# Patient Record
Sex: FEMALE | Race: WHITE | Employment: FULL TIME | URBAN - METROPOLITAN AREA
[De-identification: names, ages, dates, MRNs, and addresses within clinical notes are randomized per-mention and may not be internally consistent; named-entity substitution may affect disease eponyms.]

---

## 2017-02-23 ENCOUNTER — OFFICE VISIT (OUTPATIENT)
Dept: INTERNAL MEDICINE CLINIC | Age: 57
End: 2017-02-23

## 2017-02-23 VITALS
OXYGEN SATURATION: 98 % | BODY MASS INDEX: 22.49 KG/M2 | RESPIRATION RATE: 16 BRPM | DIASTOLIC BLOOD PRESSURE: 70 MMHG | WEIGHT: 135 LBS | HEIGHT: 65 IN | TEMPERATURE: 97.9 F | HEART RATE: 83 BPM | SYSTOLIC BLOOD PRESSURE: 92 MMHG

## 2017-02-23 DIAGNOSIS — J32.9 CHRONIC SINUSITIS WITH RECURRENT BRONCHITIS: ICD-10-CM

## 2017-02-23 DIAGNOSIS — R06.02 SHORTNESS OF BREATH: ICD-10-CM

## 2017-02-23 DIAGNOSIS — J40 CHRONIC SINUSITIS WITH RECURRENT BRONCHITIS: ICD-10-CM

## 2017-02-23 DIAGNOSIS — J32.0 CHRONIC MAXILLARY SINUSITIS: Primary | ICD-10-CM

## 2017-02-23 DIAGNOSIS — J32.0 CHRONIC MAXILLARY SINUSITIS: ICD-10-CM

## 2017-02-23 RX ORDER — CLINDAMYCIN HYDROCHLORIDE 300 MG/1
CAPSULE ORAL
Qty: 25 CAP | Refills: 0 | Status: SHIPPED | OUTPATIENT
Start: 2017-02-23 | End: 2017-03-02 | Stop reason: SDUPTHER

## 2017-02-23 NOTE — PATIENT INSTRUCTIONS
Health Maintenance Due   Topic Date Due    Hepatitis C Test  1960    Pneumococcal Vaccine (1 of 3 - PCV13) 11/28/1981    DTaP/Tdap/Td  (1 - Tdap) 10/23/1998    Breast Cancer Screening  11/28/2012    Stool testing for trace blood  11/28/2012    Cervical Cancer Screening  05/03/2014    Flu Vaccine  08/01/2016

## 2017-02-23 NOTE — PROGRESS NOTES
1. Have you been to the ER, urgent care clinic since your last visit? Hospitalized since your last visit? No    2. Have you seen or consulted any other health care providers outside of the [address] [place] [address] since your last visit? Include any pap smears or colon screening.  Yes When: Since last visit Where: Dr. Jammie Caraballo Reason for visit: Right elbow Clkeaned out elbow

## 2017-02-25 NOTE — PROGRESS NOTES
The patient presents to the office today with the chief complaint of Sinus Congestion    HPI    The patient complains of sinus congestion with facial fullness. She has difficulty breathing through her nose. The patient also has chest congestion with a minimally productive cough and dyspnea. she denies fever. Review of Systems   Respiratory: Positive for cough and shortness of breath. Cardiovascular: Negative for chest pain and leg swelling. Allergies   Allergen Reactions    Latex Rash    Latex, Natural Rubber Other (comments)    Egg Rash     Other reaction(s): unknown    Lactose Other (comments)    Milk Containing Products Rash    Wheat Rash     Other reaction(s): unknown  Determined by testing       Current Outpatient Prescriptions   Medication Sig Dispense Refill    clindamycin (CLEOCIN) 300 mg capsule 1 capsule three times per day 25 Cap 0    triamterene-hydroCHLOROthiazide (MAXZIDE) 75-50 mg per tablet Take 2 tablets by mouth  daily 180 Tab 2    oxybutynin chloride XL (DITROPAN XL) 10 mg CR tablet Take 1 tablet by mouth two  times daily 90 Tab 3    CLARITIN-D 12 HOUR 5-120 mg per tablet   1    Dexlansoprazole (DEXILANT) 60 mg CpDB Take  by mouth.  montelukast (SINGULAIR) 10 mg tablet Take 10 mg by mouth daily.  immun glob G,IgG,-sucr-IgA 50+ 3 gram solr by IntraVENous route.          Past Medical History:   Diagnosis Date    Bullous impetigo     6/13    Chronic recurrent bronchitis     Chronic sinusitis     Chronic urinary tract infection     Degenerative joint disease     right knee arthroscopy  7/13    Environmental allergies     Eosinophilic esophagitis     GERD     Hemorrhoids     IgG3 subclass deficiency     Patent ductus arteriosus     Patent ductus arteriosus repair     [address]    Stress incontinence     bladder sling 10/13    Urinary incontinence        Past Surgical History:   Procedure Laterality Date    HX BLADDER SUSPENSION 10/13    HX CERVICAL FUSION      C5-6    HX FRACTURE TX      age 17  right arm    HX HEMORRHOIDECTOMY      HX HYSTERECTOMY      HX KNEE ARTHROSCOPY      7/13 right    HX MOHS PROCEDURES      right x2    HX OTHER SURGICAL      PDA repair [facility]    HX OTHER SURGICAL  4/2015    sinus,     HX UROLOGICAL  11/6/15    vitor bargerplastique inj       Social History     Social History    Marital status: SINGLE     Spouse name: N/A    Number of children: N/A    Years of education: N/A     Occupational History    Not on file. Social History Main Topics    Smoking status: Former Smoker    Smokeless tobacco: Never Used    Alcohol use No    Drug use: No    Sexual activity: Yes     Other Topics Concern    Not on file     Social History Narrative    ** Merged History Encounter **            Patient does not have an advanced directive on file    Visit Vitals    BP 92/70 (BP 1 Location: Left arm, BP Patient Position: Sitting)    Pulse 83    Temp 97.9 °F (36.6 °C) (Tympanic)    Resp 16    Ht 5' 5\" (1.651 m)    Wt 135 lb (61.2 kg)    SpO2 98%    BMI 22.47 kg/m2       Physical Exam   HENT:   Mouth/Throat: No oropharyngeal exudate. TM with scarring. Not inflammed   Neck: Carotid bruit is not present. No thyromegaly present. Abdominal: There is no splenomegaly or hepatomegaly. Lymphadenopathy:     She has no cervical adenopathy. Right: No supraclavicular adenopathy present. Left: No supraclavicular adenopathy present. BMI:  OK    No visits with results within 3 Month(s) from this visit.   Latest known visit with results is:    Office Visit on 12/22/2015   Component Date Value Ref Range Status    Color (UA POC) 12/22/2015 Yellow   Final    Clarity (UA POC) 12/22/2015 Clear   Final    Glucose (UA POC) 12/22/2015 Negative  Negative Final    Bilirubin (UA POC) 12/22/2015 Negative  Negative Final    Ketones (UA POC) 12/22/2015 Negative  Negative Final    Specific gravity (UA POC) 12/22/2015 1.010  1.001 - 1.035 Final    Blood (UA POC) 12/22/2015 Negative  Negative Final    pH (UA POC) 12/22/2015 7.0  4.6 - 8.0 Final    Protein (UA POC) 12/22/2015 Negative  Negative mg/dL Final    Urobilinogen (UA POC) 12/22/2015 0.2 mg/dL  0.2 - 1 Final    Nitrites (UA POC) 12/22/2015 Negative  Negative Final    Leukocyte esterase (UA POC) 12/22/2015 Negative  Negative Final    PVR 12/22/2015 33  cc Final    FINAL REPORT 12/22/2015 Microbiology results   Final    Comment: ANTIBIOTIC ALLERGIES*  No Known Antibiotic Allergies    URINE SOURCE:  Clean Catch    COLONY COUNT/RESULT:  1,000 cfu/ml Mixed Urogenital / Skin Dawn         . No results found for any visits on 02/23/17. Assessment / Plan      ICD-10-CM ICD-9-CM    1. Chronic maxillary sinusitis J32.0 473.0 ANTI-NEUTROPHIL CYTOPLASMIC AB      CBC WITH AUTOMATED DIFF      METABOLIC PANEL, COMPREHENSIVE      C REACTIVE PROTEIN, QT      clindamycin (CLEOCIN) 300 mg capsule      AK COLLECTION VENOUS BLOOD,VENIPUNCTURE   2. Chronic recurrent bronchitis J32.9 473.9 ANTI-NEUTROPHIL CYTOPLASMIC AB    J40 490 CBC WITH AUTOMATED DIFF      METABOLIC PANEL, COMPREHENSIVE      C REACTIVE PROTEIN, QT      clindamycin (CLEOCIN) 300 mg capsule      AK COLLECTION VENOUS BLOOD,VENIPUNCTURE   3. Shortness of breath R06.02 786.05 ANTI-NEUTROPHIL CYTOPLASMIC AB      CBC WITH AUTOMATED DIFF      METABOLIC PANEL, COMPREHENSIVE      C REACTIVE PROTEIN, QT      clindamycin (CLEOCIN) 300 mg capsule      AK COLLECTION VENOUS BLOOD,VENIPUNCTURE     Labs including labs for Oneyda's Granulomatosis and Alpha 1 Antitrypsin Deficiency  Clindamycin  she was advised to continue her maintenance medications    Follow-up Disposition:  Return in about 3 weeks (around 3/16/2017). I asked Nilton Izaguirre if she has any questions and I answered the questions. Nilton Ramirez states that she understands the treatment plan and agrees with the treatment plan

## 2017-02-27 LAB
A-G RATIO,AGRAT: 1.7 RATIO (ref 1.1–2.6)
ABSOLUTE LYMPHOCYTE COUNT, 10803: 1.7 K/UL (ref 1–4.8)
ALBUMIN SERPL-MCNC: 4.6 G/DL (ref 3.5–5)
ALP SERPL-CCNC: 71 U/L (ref 25–115)
ALT SERPL-CCNC: 8 U/L (ref 5–40)
ANION GAP SERPL CALC-SCNC: 17 MMOL/L
AST SERPL W P-5'-P-CCNC: 21 U/L (ref 10–37)
ATYPICAL PANCA: NORMAL TITER
BASOPHILS # BLD: 0 K/UL (ref 0–0.2)
BASOPHILS NFR BLD: 0 % (ref 0–2)
BILIRUB SERPL-MCNC: 0.3 MG/DL (ref 0.2–1.2)
BUN SERPL-MCNC: 16 MG/DL (ref 6–22)
C-REACTIVE PROTEIN, QT, 006627: 0.1 MG/DL (ref 0–0.5)
CALCIUM SERPL-MCNC: 10 MG/DL (ref 8.4–10.5)
CHLORIDE SERPL-SCNC: 90 MMOL/L (ref 98–110)
CO2 SERPL-SCNC: 27 MMOL/L (ref 20–32)
CREAT SERPL-MCNC: 0.8 MG/DL (ref 0.5–1.2)
CYTOPLASMIC (C-ANCA), 162400: NORMAL TITER
EOSINOPHIL # BLD: 0.2 K/UL (ref 0–0.5)
EOSINOPHIL NFR BLD: 3 % (ref 0–6)
ERYTHROCYTE [DISTWIDTH] IN BLOOD BY AUTOMATED COUNT: 12.9 % (ref 10–16)
GFRAA, 66117: >60
GFRNA, 66118: >60
GLOBULIN,GLOB: 2.7 G/DL (ref 2–4)
GLUCOSE SERPL-MCNC: 95 MG/DL (ref 65–99)
GRANULOCYTES,GRANS: 64 % (ref 40–75)
HCT VFR BLD AUTO: 43.7 % (ref 35.1–48)
HGB BLD-MCNC: 14.6 G/DL (ref 11.7–16)
LYMPHOCYTES, LYMLT: 25 % (ref 27–45)
MCH RBC QN AUTO: 30 PG (ref 26–34)
MCHC RBC AUTO-ENTMCNC: 33 G/DL (ref 32–36)
MCV RBC AUTO: 91 FL (ref 80–95)
MONOCYTES # BLD: 0.6 K/UL (ref 0.1–0.9)
MONOCYTES NFR BLD: 8 % (ref 3–9)
NEUTROPHILS # BLD AUTO: 4.4 K/UL (ref 1.8–7.7)
PERINULCEAR (P-ANCA), 13235: NORMAL TITER
PLATELET # BLD AUTO: 436 K/UL (ref 140–440)
PMV BLD AUTO: 9.9 FL (ref 6–10.8)
POTASSIUM SERPL-SCNC: 3.4 MMOL/L (ref 3.5–5.5)
PROT SERPL-MCNC: 7.3 G/DL (ref 6.4–8.3)
RBC # BLD AUTO: 4.81 M/UL (ref 3.8–5.2)
SODIUM SERPL-SCNC: 134 MMOL/L (ref 133–145)
WBC # BLD AUTO: 6.8 K/UL (ref 4–11)

## 2017-03-01 DIAGNOSIS — J32.0 CHRONIC MAXILLARY SINUSITIS: ICD-10-CM

## 2017-03-01 DIAGNOSIS — J40 CHRONIC SINUSITIS WITH RECURRENT BRONCHITIS: ICD-10-CM

## 2017-03-01 DIAGNOSIS — J32.9 CHRONIC SINUSITIS WITH RECURRENT BRONCHITIS: ICD-10-CM

## 2017-03-01 DIAGNOSIS — R06.02 SHORTNESS OF BREATH: ICD-10-CM

## 2017-03-01 NOTE — TELEPHONE ENCOUNTER
Patient called and stated that she is still not doing well. She is stilling having to stop and sit down from being out of breath. She would like for the nurse to give her a call back at .

## 2017-03-02 NOTE — TELEPHONE ENCOUNTER
Per Dr Carmina Azul we will do another 5 days of antibiotic and a prednisone pack--patient is to return call to the office if no better after prednisone

## 2017-03-02 NOTE — TELEPHONE ENCOUNTER
Called and spoke with patient who states she is some better but not a lot. Patient states that she still doesn't seem to breath well and that she becomes fatigued easily while trying to walk through house. Questions if anything else needs to be done.

## 2017-03-02 NOTE — TELEPHONE ENCOUNTER
Called and left patient message on her personal voice mail of message--rx's called into [facility] ok per Dr Sharif

## 2017-03-05 RX ORDER — PREDNISONE 20 MG/1
TABLET ORAL
Qty: 20 TAB | Refills: 0 | OUTPATIENT
Start: 2017-03-05 | End: 2017-06-29 | Stop reason: ALTCHOICE

## 2017-03-05 RX ORDER — CLINDAMYCIN HYDROCHLORIDE 300 MG/1
CAPSULE ORAL
Qty: 15 CAP | Refills: 0 | OUTPATIENT
Start: 2017-03-05 | End: 2017-06-29 | Stop reason: ALTCHOICE

## 2017-03-06 NOTE — TELEPHONE ENCOUNTER
Patient called to let Peterson Bhandari know she is still feeling bad and having shortness of breath.   She would like for Peterson Bhandari to call her back at [phone]

## 2017-03-07 RX ORDER — CLARITHROMYCIN 500 MG/1
500 TABLET, FILM COATED ORAL 2 TIMES DAILY
Qty: 14 TAB | Refills: 0 | OUTPATIENT
Start: 2017-03-07 | End: 2017-03-14

## 2017-06-25 DIAGNOSIS — J40 CHRONIC SINUSITIS WITH RECURRENT BRONCHITIS: ICD-10-CM

## 2017-06-25 DIAGNOSIS — J32.0 CHRONIC MAXILLARY SINUSITIS: ICD-10-CM

## 2017-06-25 DIAGNOSIS — R06.02 SHORTNESS OF BREATH: ICD-10-CM

## 2017-06-25 DIAGNOSIS — J32.9 CHRONIC SINUSITIS WITH RECURRENT BRONCHITIS: ICD-10-CM

## 2017-06-29 ENCOUNTER — OFFICE VISIT (OUTPATIENT)
Dept: INTERNAL MEDICINE CLINIC | Age: 57
End: 2017-06-29

## 2017-06-29 VITALS
SYSTOLIC BLOOD PRESSURE: 98 MMHG | OXYGEN SATURATION: 96 % | HEIGHT: 65 IN | HEART RATE: 76 BPM | RESPIRATION RATE: 16 BRPM | TEMPERATURE: 98.9 F | DIASTOLIC BLOOD PRESSURE: 68 MMHG | BODY MASS INDEX: 20.83 KG/M2 | WEIGHT: 125 LBS

## 2017-06-29 DIAGNOSIS — J32.0 CHRONIC MAXILLARY SINUSITIS: ICD-10-CM

## 2017-06-29 DIAGNOSIS — Z02.1 PHYSICAL EXAM, PRE-EMPLOYMENT: ICD-10-CM

## 2017-06-29 DIAGNOSIS — Z00.00 ROUTINE PHYSICAL EXAMINATION: Primary | ICD-10-CM

## 2017-06-29 RX ORDER — SULFAMETHOXAZOLE AND TRIMETHOPRIM 800; 160 MG/1; MG/1
1 TABLET ORAL 2 TIMES DAILY
COMMUNITY
End: 2018-08-28

## 2017-06-29 NOTE — PATIENT INSTRUCTIONS
Health Maintenance Due   Topic    Hepatitis C Screening     Pneumococcal 19-64 Highest Risk (1 of 3 - PCV13)    DTaP/Tdap/Td series (1 - Tdap)    BREAST CANCER SCRN MAMMOGRAM     FOBT Q 1 YEAR AGE 50-75     PAP AKA CERVICAL CYTOLOGY

## 2017-06-29 NOTE — PROGRESS NOTES
1. Have you been to the ER, urgent care clinic since your last visit? Hospitalized since your last visit? [facility] - Pneumonia    2. Have you seen or consulted any other health care providers outside of the [facility] since your last visit? Include any pap smears or colon screening.  No

## 2017-06-30 LAB
A-G RATIO,AGRAT: 1.9 RATIO (ref 1.1–2.6)
ABSOLUTE LYMPHOCYTE COUNT, 10803: 1.9 K/UL (ref 1–4.8)
ALBUMIN SERPL-MCNC: 4.6 G/DL (ref 3.5–5)
ALP SERPL-CCNC: 82 U/L (ref 25–115)
ALT SERPL-CCNC: 9 U/L (ref 5–40)
ANION GAP SERPL CALC-SCNC: 14 MMOL/L
AST SERPL W P-5'-P-CCNC: 21 U/L (ref 10–37)
BASOPHILS # BLD: 0 K/UL (ref 0–0.2)
BASOPHILS NFR BLD: 0 % (ref 0–2)
BILIRUB SERPL-MCNC: 0.3 MG/DL (ref 0.2–1.2)
BUN SERPL-MCNC: 11 MG/DL (ref 6–22)
CALCIUM SERPL-MCNC: 10 MG/DL (ref 8.4–10.5)
CHLORIDE SERPL-SCNC: 89 MMOL/L (ref 98–110)
CO2 SERPL-SCNC: 29 MMOL/L (ref 20–32)
CREAT SERPL-MCNC: 1 MG/DL (ref 0.5–1.2)
EOSINOPHIL # BLD: 0.4 K/UL (ref 0–0.5)
EOSINOPHIL NFR BLD: 4 % (ref 0–6)
ERYTHROCYTE [DISTWIDTH] IN BLOOD BY AUTOMATED COUNT: 12.7 % (ref 10–16)
GFRAA, 66117: >60
GFRNA, 66118: 55.2
GLOBULIN,GLOB: 2.4 G/DL (ref 2–4)
GLUCOSE SERPL-MCNC: 105 MG/DL (ref 65–99)
GRANULOCYTES,GRANS: 68 % (ref 40–75)
HBV SURFACE AB SER RIA-ACNC: DETECTED
HCT VFR BLD AUTO: 42.9 % (ref 35.1–48)
HGB BLD-MCNC: 14 G/DL (ref 11.7–16)
LYMPHOCYTES, LYMLT: 20 % (ref 27–45)
MCH RBC QN AUTO: 30 PG (ref 26–34)
MCHC RBC AUTO-ENTMCNC: 33 G/DL (ref 32–36)
MCV RBC AUTO: 92 FL (ref 80–95)
MONOCYTES # BLD: 0.8 K/UL (ref 0.1–0.9)
MONOCYTES NFR BLD: 8 % (ref 3–9)
NEUTROPHILS # BLD AUTO: 6.6 K/UL (ref 1.8–7.7)
PLATELET # BLD AUTO: 415 K/UL (ref 140–440)
PMV BLD AUTO: 9.1 FL (ref 6–10.8)
POTASSIUM SERPL-SCNC: 3.2 MMOL/L (ref 3.5–5.5)
PROT SERPL-MCNC: 7 G/DL (ref 6.4–8.3)
RBC # BLD AUTO: 4.68 M/UL (ref 3.8–5.2)
RUBV IGG SERPL IA-ACNC: 3 AI
SODIUM SERPL-SCNC: 132 MMOL/L (ref 133–145)
VZV IGM SER IA-ACNC: 0.9 AI
WBC # BLD AUTO: 9.7 K/UL (ref 4–11)

## 2017-06-30 NOTE — PROGRESS NOTES
The patient presents to the office today with the chief complaint of sinus congestion    HPI    The patient complains of congestion and pressure in her right sinus. CT scan is consistent with a chronic infection and impaired drainage. The patint brennon labs for [employer] school and possible employment in the future  Review of Systems   HENT: Positive for congestion. Respiratory: Negative for shortness of breath. Cardiovascular: Negative for chest pain and leg swelling. Allergies   Allergen Reactions    Latex Rash    Latex, Natural Rubber Other (comments)    Egg Rash     Other reaction(s): unknown    Lactose Other (comments)    Milk Containing Products Rash    Wheat Rash     Other reaction(s): unknown  Determined by testing       Current Outpatient Prescriptions   Medication Sig Dispense Refill    trimethoprim-sulfamethoxazole (BACTRIM DS, SEPTRA DS) 160-800 mg per tablet Take 1 Tab by mouth two (2) times a day.  triamterene-hydroCHLOROthiazide (MAXZIDE) 75-50 mg per tablet Take 2 tablets by mouth  daily 180 Tab 2    oxybutynin chloride XL (DITROPAN XL) 10 mg CR tablet Take 1 tablet by mouth two  times daily 90 Tab 3    CLARITIN-D 12 HOUR 5-120 mg per tablet   1    Dexlansoprazole (DEXILANT) 60 mg CpDB Take  by mouth.  montelukast (SINGULAIR) 10 mg tablet Take 10 mg by mouth daily.          Past Medical History:   Diagnosis Date    Bullous impetigo     6/13    Chronic recurrent bronchitis     Chronic sinusitis     Chronic urinary tract infection     Degenerative joint disease     right knee arthroscopy  7/13    Environmental allergies     Eosinophilic esophagitis     GERD     Hemorrhoids     IgG3 subclass deficiency     Patent ductus arteriosus     Patent ductus arteriosus repair     [facility]    Stress incontinence     bladder sling 10/13    Urinary incontinence        Past Surgical History:   Procedure Laterality Date    HX BLADDER SUSPENSION      10/13  HX CERVICAL FUSION      C5-6    HX FRACTURE TX      age 17  right arm    HX HEMORRHOIDECTOMY      HX HYSTERECTOMY      HX KNEE ARTHROSCOPY      7/13 right    HX MOHS PROCEDURES      right x2    HX OTHER SURGICAL      PDA repair 1966 [facility]    HX OTHER SURGICAL  4/2015    sinus,     HX UROLOGICAL  11/6/15    vitor bargerplastjhonatan inj       Social History     Social History    Marital status: SINGLE     Spouse name: N/A    Number of children: N/A    Years of education: N/A     Occupational History    Not on file. Social History Main Topics    Smoking status: Former Smoker    Smokeless tobacco: Never Used    Alcohol use No    Drug use: No    Sexual activity: Yes     Other Topics Concern    Not on file     Social History Narrative    ** Merged History Encounter **            Patient does not have an advanced directive on file    Visit Vitals    BP 98/68 (BP 1 Location: Left arm, BP Patient Position: Sitting)    Pulse 76    Temp 98.9 °F (37.2 °C) (Tympanic)    Resp 16    Ht 5' 5\" (1.651 m)    Wt 125 lb (56.7 kg)    SpO2 96%    BMI 20.8 kg/m2       Physical Exam   No Cervical Lymphadenopathy  No Supraclavicular Lymphadenopathy  Thyroid is Normal  Lungs are clear to ausculation and percussion  Heart:  S1 S2 are normal, No gallops, No mummers  No Carotid Bruits  Abdomen:  Normal Bowel Sounds. No tenderness. No masses. No Hepatomegaly or Splenomegly  LE:  Strong Pedal Pulses. No Edema      BMI:  OK    No visits with results within 3 Month(s) from this visit.   Latest known visit with results is:    Orders Only on 02/23/2017   Component Date Value Ref Range Status    Glucose 02/23/2017 95  65 - 99 mg/dL Final    BUN 02/23/2017 16  6 - 22 mg/dL Final    Creatinine 02/23/2017 0.8  0.5 - 1.2 mg/dL Final    Sodium 02/23/2017 134  133 - 145 mmol/L Final    Potassium 02/23/2017 3.4* 3.5 - 5.5 mmol/L Final    Chloride 02/23/2017 90* 98 - 110 mmol/L Final    CO2 02/23/2017 27  20 - 32 mmol/L Final    AST (SGOT) 02/23/2017 21  10 - 37 U/L Final    ALT (SGPT) 02/23/2017 8  5 - 40 U/L Final    Alk. phosphatase 02/23/2017 71  25 - 115 U/L Final    Bilirubin, total 02/23/2017 0.3  0.2 - 1.2 mg/dL Final    Calcium 02/23/2017 10.0  8.4 - 10.5 mg/dL Final    Protein, total 02/23/2017 7.3  6.4 - 8.3 g/dL Final    Albumin 02/23/2017 4.6  3.5 - 5.0 g/dL Final    A-G Ratio 02/23/2017 1.7  1.1 - 2.6 ratio Final    Globulin 02/23/2017 2.7  2.0 - 4.0 g/dL Final    Anion gap 02/23/2017 17.0  mmol/L Final    Comment: Test includes Albumin, Alkaline Phosphatase, ALT, AST, BUN, Calcium, CO2,  Chloride, Creatinine, Glucose, Potassium, Sodium, Total Bilirubin and Total  Protein. Estimated GFR results are reported in mL/min/1.73 sq.m. by the MDRD equation. This eGFR is validated for stable chronic renal failure patients. This   equation  is unreliable in acute illness or patients with normal renal function.  GFRAA 02/23/2017 >60.0  >60.0 Final    GFRNA 02/23/2017 >60.0  >60.0 Final    C-Reactive Protein, Qt 02/23/2017 0.1  0.0 - 0.5 mg/dL Final    PERINULCEAR (P-ANCA) 02/23/2017 <1:20  Neg:<1:20 titer Final    Comment: The presence of positive fluorescence exhibiting P-ANCA or C-ANCA  patterns alone is not specific for the diagnosis of Wegener's  Granulomatosis (WG) or microscopic polyangiitis. Decisions about  treatment should not be based solely on ANCA IFA results. The  International ANCA Group Consensus recommends follow up testing of  positive sera with both FL-3 and MPO-ANCA enzyme immunoassays. As  many as 5% serum samples are positive only by EIA. Ref. AM J Clin Pathol 1999;111:507-513.  Atypical pANCA 02/23/2017 <1:20  Neg:<1:20 titer Final    Comment: The atypical pANCA pattern has been observed in a significant  percentage of patients with ulcerative colitis, primary sclerosing  cholangitis and autoimmune hepatitis.       Cytoplasmic (C-ANCA) Ab 02/23/2017 <1:20 Neg:<1:20 titer Final    Comment: Test includes C-ANCA and P-ANCA. Performed at:  [address]  [id number]  : Dory Singh MD, Phone:  [phone]      WBC 02/23/2017 6.8  4.0 - 11.0 K/uL Final    RBC 02/23/2017 4.81  3.80 - 5.20 M/uL Final    HGB 02/23/2017 14.6  11.7 - 16.0 g/dL Final    HCT 02/23/2017 43.7  35.1 - 48.0 % Final    MCV 02/23/2017 91  80 - 95 fL Final    MCH 02/23/2017 30  26 - 34 pg Final    MCHC 02/23/2017 33  32 - 36 g/dL Final    RDW 02/23/2017 12.9  10.0 - 16.0 % Final    PLATELET 51/24/1612 735  140 - 440 K/uL Final    MPV 02/23/2017 9.9  6.0 - 10.8 fL Final    NEUTROPHILS 02/23/2017 64  40 - 75 % Final    Lymphocytes 02/23/2017 25* 27 - 45 % Final    MONOCYTES 02/23/2017 8  3 - 9 % Final    EOSINOPHILS 02/23/2017 3  0 - 6 % Final    BASOPHILS 02/23/2017 0  0 - 2 % Final    ABS. NEUTROPHILS 02/23/2017 4.4  1.8 - 7.7 K/uL Final    ABSOLUTE LYMPHOCYTE COUNT 02/23/2017 1.7  1.0 - 4.8 K/uL Final    ABS. MONOCYTES 02/23/2017 0.6  0.1 - 0.9 K/uL Final    ABS. EOSINOPHILS 02/23/2017 0.2  0.0 - 0.5 K/uL Final    ABS. BASOPHILS 02/23/2017 0.0  0.0 - 0.2 K/uL Final       .No results found for any visits on 06/29/17. Assessment / Plan      ICD-10-CM ICD-9-CM    1. Physical exam, pre-employment Z02.1 V70.5 trimethoprim-sulfamethoxazole (BACTRIM DS, SEPTRA DS) 160-800 mg per tablet      HEP B SURFACE AB      RUBELLA AB, IGG      VZV AB, IGG      CBC WITH AUTOMATED DIFF      METABOLIC PANEL, COMPREHENSIVE   2. Chronic maxillary sinusitis J32.0 473.0 trimethoprim-sulfamethoxazole (BACTRIM DS, SEPTRA DS) 160-800 mg per tablet      HEP B SURFACE AB      RUBELLA AB, IGG      VZV AB, IGG      CBC WITH AUTOMATED DIFF      METABOLIC PANEL, COMPREHENSIVE     Labs  Follow up with ENT -  antibiotics to see if symptoms quiet    Follow-up Disposition:  Return in about 6 months (around 12/29/2017). I asked Lethaniel Sandifer. Fisher if she has any questions and I answered the questions. Homar Olson.  Bettye Zeb states that she understands the treatment plan and agrees with the treatment plan

## 2017-07-11 RX ORDER — POTASSIUM CHLORIDE 750 MG/1
10 TABLET, EXTENDED RELEASE ORAL DAILY
Qty: 30 TAB | Refills: 1 | Status: SHIPPED | OUTPATIENT
Start: 2017-07-11 | End: 2017-12-01 | Stop reason: ALTCHOICE

## 2017-10-20 RX ORDER — TRIAMTERENE AND HYDROCHLOROTHIAZIDE 75; 50 MG/1; MG/1
TABLET ORAL
Qty: 180 TAB | Refills: 2 | Status: SHIPPED | OUTPATIENT
Start: 2017-10-20 | End: 2018-08-28

## 2017-10-20 NOTE — TELEPHONE ENCOUNTER
Requested Prescriptions     Pending Prescriptions Disp Refills    triamterene-hydroCHLOROthiazide (MAXZIDE) 75-50 mg per tablet 180 Tab 2

## 2017-10-29 DIAGNOSIS — Z02.1 PHYSICAL EXAM, PRE-EMPLOYMENT: ICD-10-CM

## 2017-10-29 DIAGNOSIS — J32.0 CHRONIC MAXILLARY SINUSITIS: ICD-10-CM

## 2018-01-29 ENCOUNTER — TELEPHONE (OUTPATIENT)
Dept: INTERNAL MEDICINE CLINIC | Age: 58
End: 2018-01-29

## 2018-01-29 DIAGNOSIS — F43.9 STRESS: Primary | ICD-10-CM

## 2018-01-29 RX ORDER — LORAZEPAM 0.5 MG/1
0.5 TABLET ORAL
Qty: 15 TAB | Refills: 1 | Status: SHIPPED | OUTPATIENT
Start: 2018-01-29 | End: 2018-08-28

## 2018-01-29 NOTE — TELEPHONE ENCOUNTER
Per Dr. Shagufta Dunn, I talked with patient to let her know that Dr. Shagufta Dunn is sending in Ativan 0.5mg tablet at [facility] to help her sleep. Patient verbalized understanding.

## 2018-01-29 NOTE — TELEPHONE ENCOUNTER
Patient was here on Friday with her Mom for an appointment and Dr Ivan Oakes said he would send in a sleep aide that is non drowsy to [facility] on [address].  Please advise patient at [phone].

## 2018-01-30 NOTE — TELEPHONE ENCOUNTER
Called the following rx into [facility] on file per Dr. Negro Bruce:    Lorazepam 0.5mg tab  #15, #1 RF    Dx: Q03.0

## 2018-02-05 ENCOUNTER — TELEPHONE (OUTPATIENT)
Dept: INTERNAL MEDICINE CLINIC | Age: 58
End: 2018-02-05

## 2018-02-05 NOTE — TELEPHONE ENCOUNTER
Patient was prescribed Ativan and has been taking it for a week. She stated it is not working. Please advise [phone].

## 2018-02-06 ENCOUNTER — TELEPHONE (OUTPATIENT)
Dept: INTERNAL MEDICINE CLINIC | Age: 58
End: 2018-02-06

## 2018-02-06 DIAGNOSIS — F51.01 PRIMARY INSOMNIA: Primary | ICD-10-CM

## 2018-02-06 RX ORDER — ZOLPIDEM TARTRATE 5 MG/1
5 TABLET ORAL
Qty: 30 TAB | Refills: 0
Start: 2018-02-06 | End: 2018-03-14 | Stop reason: SDUPTHER

## 2018-02-09 RX ORDER — TRAZODONE HYDROCHLORIDE 100 MG/1
TABLET ORAL
Qty: 30 TAB | Refills: 1 | Status: SHIPPED | OUTPATIENT
Start: 2018-02-09 | End: 2018-08-28

## 2018-03-09 ENCOUNTER — TELEPHONE (OUTPATIENT)
Dept: INTERNAL MEDICINE CLINIC | Age: 58
End: 2018-03-09

## 2018-03-09 RX ORDER — CEFUROXIME AXETIL 500 MG/1
TABLET ORAL
Qty: 14 TAB | Refills: 0 | Status: SHIPPED | OUTPATIENT
Start: 2018-03-09 | End: 2018-08-28

## 2018-03-09 RX ORDER — PREDNISONE 20 MG/1
TABLET ORAL
Qty: 25 TAB | Refills: 0 | Status: SHIPPED | OUTPATIENT
Start: 2018-03-09 | End: 2018-08-28

## 2018-03-14 DIAGNOSIS — F51.01 PRIMARY INSOMNIA: ICD-10-CM

## 2018-03-14 RX ORDER — ZOLPIDEM TARTRATE 5 MG/1
5 TABLET ORAL
Qty: 30 TAB | Refills: 0 | Status: SHIPPED | OUTPATIENT
Start: 2018-03-14 | End: 2018-04-17 | Stop reason: SDUPTHER

## 2018-03-14 NOTE — TELEPHONE ENCOUNTER
From: Tanya Mann  To: Grant Almonte MD  Sent: 3/14/2018 10:21 AM EDT  Subject: Medication Renewal Request    Original authorizing provider: MD Ailyn Thompson would like a refill of the following medications:  zolpidem (AMBIEN) 5 mg tablet Grant Almonte MD]    Preferred pharmacy: [place] [id number] [facility] AT [address] [place] [address]    Comment:

## 2018-03-15 ENCOUNTER — TELEPHONE (OUTPATIENT)
Dept: INTERNAL MEDICINE CLINIC | Age: 58
End: 2018-03-15

## 2018-04-17 DIAGNOSIS — F51.01 PRIMARY INSOMNIA: ICD-10-CM

## 2018-04-18 NOTE — TELEPHONE ENCOUNTER
From: Araceli Holland  To: Jessica Hinkle MD  Sent: 4/17/2018 9:58 PM EDT  Subject: Medication Renewal Request    Original authorizing provider: MD Phuc Smith.  Dmitriy would like a refill of the following medications:  zolpidem (AMBIEN) 5 mg tablet Jessica Hinkle MD]    Preferred pharmacy: [place] [id number] [facility]  AT [address] [place] [address]    Comment:

## 2018-04-19 RX ORDER — ZOLPIDEM TARTRATE 5 MG/1
5 TABLET ORAL
Qty: 30 TAB | Refills: 0 | Status: SHIPPED | OUTPATIENT
Start: 2018-04-19 | End: 2018-05-22 | Stop reason: SDUPTHER

## 2018-05-22 DIAGNOSIS — F51.01 PRIMARY INSOMNIA: ICD-10-CM

## 2018-05-23 RX ORDER — ZOLPIDEM TARTRATE 5 MG/1
5 TABLET ORAL
Qty: 30 TAB | Refills: 0 | Status: SHIPPED | OUTPATIENT
Start: 2018-05-23 | End: 2018-06-27 | Stop reason: SDUPTHER

## 2018-05-23 NOTE — TELEPHONE ENCOUNTER
From: Tiffany Tejeda  To: Saran Birmingham MD  Sent: 5/22/2018 9:55 PM EDT  Subject: Medication Renewal Request    Original authorizing provider: MD Darshana Burrows.  Dmitriy would like a refill of the following medications:  zolpidem (AMBIEN) 5 mg tablet Saran Birmingham MD]    Preferred pharmacy: [place] [id number] [facility]  AT [address] [place] [address]    Comment:

## 2018-06-27 DIAGNOSIS — F51.01 PRIMARY INSOMNIA: ICD-10-CM

## 2018-06-28 RX ORDER — ZOLPIDEM TARTRATE 5 MG/1
5 TABLET ORAL
Qty: 30 TAB | Refills: 0 | Status: SHIPPED | OUTPATIENT
Start: 2018-06-28 | End: 2018-08-11 | Stop reason: SDUPTHER

## 2018-06-28 NOTE — TELEPHONE ENCOUNTER
From: Dulce Johnson  To: Isabel Tanner MD  Sent: 6/27/2018 9:09 PM EDT  Subject: Medication Renewal Request    Original authorizing provider: MD Ailyn Brand would like a refill of the following medications:  zolpidem (AMBIEN) 5 mg tablet Isabel Tanner MD]    Preferred pharmacy: [place] [id number] [facility]  AT [address] [place] [address]    Comment:

## 2018-08-11 DIAGNOSIS — F51.01 PRIMARY INSOMNIA: ICD-10-CM

## 2018-08-13 RX ORDER — ZOLPIDEM TARTRATE 5 MG/1
5 TABLET ORAL
Qty: 30 TAB | Refills: 0 | Status: SHIPPED | OUTPATIENT
Start: 2018-08-13 | End: 2018-11-30 | Stop reason: SDUPTHER

## 2018-08-13 NOTE — TELEPHONE ENCOUNTER
From: Judson Nash  To: Alec Gallardo MD  Sent: 8/11/2018 4:22 PM EDT  Subject: Medication Renewal Request    Original authorizing provider: MD Royal Jl Wang would like a refill of the following medications:  zolpidem (AMBIEN) 5 mg tablet Alec Gallardo MD]    Preferred pharmacy: [place] [id number] [facility]  AT [address] [place] [address]    Comment:

## 2018-08-17 ENCOUNTER — TELEPHONE (OUTPATIENT)
Dept: INTERNAL MEDICINE CLINIC | Age: 58
End: 2018-08-17

## 2018-11-09 ENCOUNTER — TELEPHONE (OUTPATIENT)
Dept: INTERNAL MEDICINE CLINIC | Age: 58
End: 2018-11-09

## 2018-11-09 RX ORDER — TRIAMTERENE AND HYDROCHLOROTHIAZIDE 75; 50 MG/1; MG/1
TABLET ORAL
Qty: 180 TAB | Refills: 2 | Status: SHIPPED | OUTPATIENT
Start: 2018-11-09 | End: 2021-02-11 | Stop reason: ALTCHOICE

## 2018-11-09 NOTE — TELEPHONE ENCOUNTER
The medication was not on the med list but she is requesting  Triamterene HZT (maxzide) 75-50 mg per tablet

## 2018-11-30 ENCOUNTER — OFFICE VISIT (OUTPATIENT)
Dept: INTERNAL MEDICINE CLINIC | Age: 58
End: 2018-11-30

## 2018-11-30 VITALS
OXYGEN SATURATION: 97 % | HEART RATE: 69 BPM | SYSTOLIC BLOOD PRESSURE: 100 MMHG | TEMPERATURE: 98.3 F | DIASTOLIC BLOOD PRESSURE: 76 MMHG | HEIGHT: 65 IN | BODY MASS INDEX: 21.66 KG/M2 | RESPIRATION RATE: 16 BRPM | WEIGHT: 130 LBS

## 2018-11-30 DIAGNOSIS — Z01.811 PRE-OP CHEST EXAM: ICD-10-CM

## 2018-11-30 DIAGNOSIS — F51.01 PRIMARY INSOMNIA: ICD-10-CM

## 2018-11-30 DIAGNOSIS — Z00.00 ANNUAL PHYSICAL EXAM: Primary | ICD-10-CM

## 2018-11-30 DIAGNOSIS — Z01.818 PRE-OP EVALUATION: ICD-10-CM

## 2018-11-30 RX ORDER — ZOLPIDEM TARTRATE 5 MG/1
5 TABLET ORAL
Qty: 90 TAB | Refills: 1 | Status: SHIPPED | OUTPATIENT
Start: 2018-11-30 | End: 2019-04-03 | Stop reason: SDUPTHER

## 2018-11-30 NOTE — PROGRESS NOTES
1. Have you been to the ER, urgent care clinic since your last visit? Hospitalized since your last visit? No    2. Have you seen or consulted any other health care providers outside of the [address] [place] [address] since your last visit? Include any pap smears or colon screening.  No

## 2018-11-30 NOTE — PROGRESS NOTES
The patient presents to the office today for a check up    HPI    The patient presents to the office today for a check up. The patient remains on medications for reflux. The reflux is in good control on the medications. The patient also remains  On Maxide for LE swelling. she is doing ok on the medications. .  The patient has complaints of pain with movement in her left hand and wrist..  The patient is scheduled for surgery on the tendons in her left hand and wrist      Review of Systems   Respiratory: Negative for shortness of breath. Cardiovascular: Negative for chest pain and leg swelling. Musculoskeletal:        Left wrist pain as per HPI       Allergies   Allergen Reactions    Latex Rash    Latex, Natural Rubber Other (comments)    Egg Rash     Other reaction(s): unknown    Lactose Other (comments)    Milk Containing Products Rash    Wheat Rash     Other reaction(s): unknown  Determined by testing       Current Outpatient Medications   Medication Sig Dispense Refill    zolpidem (AMBIEN) 5 mg tablet Take 1 Tab by mouth nightly as needed for Sleep. Max Daily Amount: 5 mg. 90 Tab 1    triamterene-hydroCHLOROthiazide (MAXZIDE) 75-50 mg per tablet TAKE 2 TABLETS BY MOUTH  DAILY 180 Tab 2    oxybutynin chloride XL (DITROPAN XL) 10 mg CR tablet Take 1 Tab by mouth two (2) times a day. 180 Tab 3    CLARITIN-D 12 HOUR 5-120 mg per tablet   1    Dexlansoprazole (DEXILANT) 60 mg CpDB Take  by mouth.  montelukast (SINGULAIR) 10 mg tablet Take 10 mg by mouth daily.          Past Medical History:   Diagnosis Date    Adverse effect of anesthesia     Arthropathy     Asthma     Bullous impetigo     6/13    Chronic recurrent bronchitis     Chronic sinusitis     Chronic urinary tract infection     Deficiency anemia     Degenerative joint disease     right knee arthroscopy  7/13    Environmental allergies     Eosinophilic esophagitis     Esophageal reflux     Esophageal stricture     GERD     Hemorrhoids     inter. wo mention of complication     History of blood transfusion     IgG3 subclass deficiency     MRSA (methicillin resistant Staphylococcus aureus)     Patent ductus arteriosus     Patent ductus arteriosus repair     [address]    PONV (postoperative nausea and vomiting)     Stress incontinence     bladder sling 10/13    Urinary incontinence     Urinary system disorder        Past Surgical History:   Procedure Laterality Date    HX BLADDER SUSPENSION  10/21/2013    HX CERVICAL FUSION      C5-6    HX FRACTURE TX      age 17  right arm    HX HEMORRHOIDECTOMY      HX HYSTERECTOMY      HX KNEE ARTHROSCOPY  07/2013     right    HX MOHS PROCEDURES      right x2    HX OTHER SURGICAL      PDA repair [address] OTHER SURGICAL  12/15, 2/15, 10/17, 11/17    sinus    HX OTHER SURGICAL  08/18/2014    Bronchoscopy     HX OTHER SURGICAL  02/2015, 04/2017, 08/2017    EGD    HX UROLOGICAL  11/06/2015    INJ. BULK. AGENT FOR Jose Andrew.         Social History     Socioeconomic History    Marital status: SINGLE     Spouse name: Not on file    Number of children: Not on file    Years of education: Not on file    Highest education level: Not on file   Social Needs    Financial resource strain: Not on file    Food insecurity - worry: Not on file    Food insecurity - inability: Not on file    Transportation needs - medical: Not on file   [employer] needs - non-medical: Not on file   Occupational History    Not on file   Tobacco Use    Smoking status: Never Smoker    Smokeless tobacco: Never Used    Tobacco comment: high school experiment   Substance and Sexual Activity    Alcohol use:  Yes     Alcohol/week: 0.0 oz     Comment: occasional    Drug use: No    Sexual activity: Yes   Other Topics Concern    Not on file   Social History Narrative    ** Merged History Encounter **            Patient does not have an advanced directive on file    Visit Vitals  /76 (BP 1 Location: Right arm, BP Patient Position: Sitting)   Pulse 69   Temp 98.3 °F (36.8 °C) (Tympanic)   Resp 16   Ht 5' 5\" (1.651 m)   Wt 130 lb (59 kg)   SpO2 97%   BMI 21.63 kg/m²       Physical Exam   No Cervical Lymphadenopathy  No Supraclavicular Lymphadenopathy  Thyroid is Normal  Lungs are normal to percussion. Clear to auscultation   Heart:  S1 S2 are normal, No gallops, No murmurs  No Carotid Bruits  Abdomen:  Normal Bowel Sounds. No tenderness. No masses. No Hepatomegaly or Splenomegaly  LE:  Strong Pedal Pulses.   No Edema      BMI:  OK    Office Visit on 11/30/2018   Component Date Value Ref Range Status    Specific Gravity 11/30/2018 1.005  1.005 - 1.03 Final    pH (UA) 11/30/2018 8.0  5.0 - 8.0 pH Final    Protein 11/30/2018 Negative  Negative, mg/dL Final    Glucose 11/30/2018 Negative  Negative mg/dL Final    Ketone 11/30/2018 Negative  Negative mg/dL Final    Bilirubin 11/30/2018 Negative  Negative Final    Blood 11/30/2018 Negative  Negative Final    Nitrites 11/30/2018 Negative  Negative Final    Leukocyte Esterase 11/30/2018 Trace* Negative Final    Urobilinogen 11/30/2018 <2.0  <2.0 mg/dL Final    WBC 11/30/2018 0-2  0 - 2 /hpf Final    RBC 11/30/2018 0-2  Negative, /hpf Final    Bacteria 11/30/2018 Present* Negative Final    Epithelial cells 11/30/2018 0-2  0 - 2 /hpf Final    WBC 11/30/2018 7.2  4.0 - 11.0 K/uL Final    RBC 11/30/2018 4.75  3.80 - 5.20 M/uL Final    HGB 11/30/2018 14.7  11.7 - 16.0 g/dL Final    HCT 11/30/2018 43.5  35.1 - 48.0 % Final    MCV 11/30/2018 92  80 - 95 fL Final    MCH 11/30/2018 31  26 - 34 pg Final    MCHC 11/30/2018 34  31 - 36 g/dL Final    RDW 11/30/2018 12.8  10.0 - 15.5 % Final    PLATELET 41/47/3674 732  140 - 440 K/uL Final    MPV 11/30/2018 10.1  9.0 - 13.0 fL Final    NEUTROPHILS 11/30/2018 60  40 - 75 % Final    Lymphocytes 11/30/2018 31  20 - 45 % Final    MONOCYTES 11/30/2018 7  3 - 12 % Final    EOSINOPHILS 11/30/2018 2  0 - 6 % Final    BASOPHILS 11/30/2018 1  0 - 2 % Final    ABS. NEUTROPHILS 11/30/2018 4.3  1.8 - 7.7 K/uL Final    ABSOLUTE LYMPHOCYTE COUNT 11/30/2018 2.2  1.0 - 4.8 K/uL Final    ABS. MONOCYTES 11/30/2018 0.5  0.1 - 1.0 K/uL Final    ABS. EOSINOPHILS 11/30/2018 0.2  0.0 - 0.5 K/uL Final    ABS.  BASOPHILS 11/30/2018 0.1  0.0 - 0.2 K/uL Final   Office Visit on 10/17/2018   Component Date Value Ref Range Status    Color (UA POC) 10/17/2018 Yellow   Final    Clarity (UA POC) 10/17/2018 Clear   Final    Glucose (UA POC) 10/17/2018 Negative  Negative Final    Bilirubin (UA POC) 10/17/2018 Negative  Negative Final    Ketones (UA POC) 10/17/2018 Negative  Negative Final    Specific gravity (UA POC) 10/17/2018 1.010  1.001 - 1.035 Final    Blood (UA POC) 10/17/2018 Trace  Negative Final    pH (UA POC) 10/17/2018 6.5  4.6 - 8.0 Final    Protein (UA POC) 10/17/2018 Negative  Negative Final    Urobilinogen (UA POC) 10/17/2018 0.2 mg/dL  0.2 - 1 Final    Nitrites (UA POC) 10/17/2018 Negative  Negative Final    Leukocyte esterase (UA POC) 10/17/2018 Negative  Negative Final    PVR 10/17/2018 0  cc Final   Clinical Support on 10/04/2018   Component Date Value Ref Range Status    Color (UA POC) 10/04/2018 Yellow   Final    Clarity (UA POC) 10/04/2018 Clear   Final    Glucose (UA POC) 10/04/2018 Negative  Negative Final    Bilirubin (UA POC) 10/04/2018 Negative  Negative Final    Ketones (UA POC) 10/04/2018 Negative  Negative Final    Specific gravity (UA POC) 10/04/2018 1.010  1.001 - 1.035 Final    Blood (UA POC) 10/04/2018 Trace  Negative Final    pH (UA POC) 10/04/2018 7.0  4.6 - 8.0 Final    Protein (UA POC) 10/04/2018 Negative  Negative Final    Urobilinogen (UA POC) 10/04/2018 0.2 mg/dL  0.2 - 1 Final    Nitrites (UA POC) 10/04/2018 Negative  Negative Final    Leukocyte esterase (UA POC) 10/04/2018 Negative  Negative Final    FINAL REPORT 10/04/2018 Microbiology results*  Final    Comment: ANTIBIOTIC ALLERGIES  No Known Antibiotic Allergies    URINE SOURCE:  Clean Catch    COLONY COUNT / RESULT:   50,000 cfu/ml    RESULT/ORG ID[[id number]]  Beta Strep Group B (Strep agalacticae). Penicillin, Ampicillin  and Cefazolin are drugs of choice-per CLSI guidelines. .  Results for orders placed or performed in visit on 11/30/18   URINALYSIS W/MICROSCOPIC   Result Value Ref Range    Specific Gravity 1.005 1.005 - 1.03    pH (UA) 8.0 5.0 - 8.0 pH    Protein Negative Negative, mg/dL    Glucose Negative Negative mg/dL    Ketone Negative Negative mg/dL    Bilirubin Negative Negative    Blood Negative Negative    Nitrites Negative Negative    Leukocyte Esterase Trace (A) Negative    Urobilinogen <2.0 <2.0 mg/dL    WBC 0-2 0 - 2 /hpf    RBC 0-2 Negative, /hpf    Bacteria Present (A) Negative    Epithelial cells 0-2 0 - 2 /hpf    Narrative    Unless additionally indicated, test performed at: [facility]. PH: [phone]. CBC WITH AUTOMATED DIFF   Result Value Ref Range    WBC 7.2 4.0 - 11.0 K/uL    RBC 4.75 3.80 - 5.20 M/uL    HGB 14.7 11.7 - 16.0 g/dL    HCT 43.5 35.1 - 48.0 %    MCV 92 80 - 95 fL    MCH 31 26 - 34 pg    MCHC 34 31 - 36 g/dL    RDW 12.8 10.0 - 15.5 %    PLATELET 915 603 - 227 K/uL    MPV 10.1 9.0 - 13.0 fL    NEUTROPHILS 60 40 - 75 %    Lymphocytes 31 20 - 45 %    MONOCYTES 7 3 - 12 %    EOSINOPHILS 2 0 - 6 %    BASOPHILS 1 0 - 2 %    ABS. NEUTROPHILS 4.3 1.8 - 7.7 K/uL    ABSOLUTE LYMPHOCYTE COUNT 2.2 1.0 - 4.8 K/uL    ABS. MONOCYTES 0.5 0.1 - 1.0 K/uL    ABS. EOSINOPHILS 0.2 0.0 - 0.5 K/uL    ABS. BASOPHILS 0.1 0.0 - 0.2 K/uL    Narrative    Unless additionally indicated, test performed at: [facility] PH: [phone]. Assessment / Plan      ICD-10-CM ICD-9-CM    1.  Annual physical exam Z00.00 V70.0 CBC WITH AUTOMATED DIFF METABOLIC PANEL, COMPREHENSIVE      LIPID PANEL      URINALYSIS W/MICROSCOPIC      URINALYSIS W/MICROSCOPIC      LIPID PANEL      METABOLIC PANEL, COMPREHENSIVE      CBC WITH AUTOMATED DIFF   2. Pre-op chest exam Z01.811 V72.82    3. Pre-op evaluation Z01.818 V72.84 CBC WITH AUTOMATED DIFF      METABOLIC PANEL, COMPREHENSIVE      URINALYSIS W/MICROSCOPIC      AMB POC EKG ROUTINE W/ 12 LEADS, INTER & REP      METABOLIC PANEL, COMPREHENSIVE       PATIENT IS OK FOR SURGERY    I advised the patient to continue good health habits  she was advised to continue her maintenance medications    Follow-up Disposition:  Return in about 6 months (around 5/30/2019). I asked Ra Izaguirre if she has any questions and I answered the questions. Ra Campos states that she understands the treatment plan and agrees with the treatment plan

## 2018-12-01 LAB
A-G RATIO,AGRAT: 1.6 RATIO (ref 1.1–2.6)
ABSOLUTE LYMPHOCYTE COUNT, 10803: 2.2 K/UL (ref 1–4.8)
ALBUMIN SERPL-MCNC: 4.5 G/DL (ref 3.5–5)
ALP SERPL-CCNC: 78 U/L (ref 25–115)
ALT SERPL-CCNC: 10 U/L (ref 5–40)
ANION GAP SERPL CALC-SCNC: 17 MMOL/L
AST SERPL W P-5'-P-CCNC: 20 U/L (ref 10–37)
BACTERIA,BACTU: PRESENT
BASOPHILS # BLD: 0.1 K/UL (ref 0–0.2)
BASOPHILS NFR BLD: 1 % (ref 0–2)
BILIRUB SERPL-MCNC: 0.6 MG/DL (ref 0.2–1.2)
BILIRUB UR QL: NEGATIVE
BUN SERPL-MCNC: 13 MG/DL (ref 6–22)
CALCIUM SERPL-MCNC: 10.1 MG/DL (ref 8.4–10.5)
CHLORIDE SERPL-SCNC: 92 MMOL/L (ref 98–110)
CHOLEST SERPL-MCNC: 188 MG/DL (ref 110–200)
CO2 SERPL-SCNC: 27 MMOL/L (ref 20–32)
CREAT SERPL-MCNC: 0.8 MG/DL (ref 0.5–1.2)
EOSINOPHIL # BLD: 0.2 K/UL (ref 0–0.5)
EOSINOPHIL NFR BLD: 2 % (ref 0–6)
EPITHELIAL,EPSU: ABNORMAL /HPF (ref 0–2)
ERYTHROCYTE [DISTWIDTH] IN BLOOD BY AUTOMATED COUNT: 12.8 % (ref 10–15.5)
GFRAA, 66117: >60
GFRNA, 66118: >60
GLOBULIN,GLOB: 2.9 G/DL (ref 2–4)
GLUCOSE SERPL-MCNC: 75 MG/DL (ref 70–99)
GLUCOSE UR QL: NEGATIVE MG/DL
GRANULOCYTES,GRANS: 60 % (ref 40–75)
HCT VFR BLD AUTO: 43.5 % (ref 35.1–48)
HDLC SERPL-MCNC: 1.9 MG/DL (ref 0–5)
HDLC SERPL-MCNC: 100 MG/DL (ref 40–59)
HGB BLD-MCNC: 14.7 G/DL (ref 11.7–16)
HGB UR QL STRIP: NEGATIVE
KETONES UR QL STRIP.AUTO: NEGATIVE MG/DL
LDLC SERPL CALC-MCNC: 76 MG/DL (ref 50–99)
LEUKOCYTE ESTERASE: ABNORMAL
LYMPHOCYTES, LYMLT: 31 % (ref 20–45)
MCH RBC QN AUTO: 31 PG (ref 26–34)
MCHC RBC AUTO-ENTMCNC: 34 G/DL (ref 31–36)
MCV RBC AUTO: 92 FL (ref 80–95)
MONOCYTES # BLD: 0.5 K/UL (ref 0.1–1)
MONOCYTES NFR BLD: 7 % (ref 3–12)
NEUTROPHILS # BLD AUTO: 4.3 K/UL (ref 1.8–7.7)
NITRITE UR QL STRIP.AUTO: NEGATIVE
PH UR STRIP: 8 PH (ref 5–8)
PLATELET # BLD AUTO: 382 K/UL (ref 140–440)
PMV BLD AUTO: 10.1 FL (ref 9–13)
POTASSIUM SERPL-SCNC: 3.4 MMOL/L (ref 3.5–5.5)
PROT SERPL-MCNC: 7.4 G/DL (ref 6.4–8.3)
PROT UR QL STRIP: NEGATIVE MG/DL
RBC # BLD AUTO: 4.75 M/UL (ref 3.8–5.2)
RBC #/AREA URNS HPF: ABNORMAL /HPF
SODIUM SERPL-SCNC: 136 MMOL/L (ref 133–145)
SP GR UR: 1 (ref 1–1.03)
TRIGL SERPL-MCNC: 63 MG/DL (ref 40–149)
UROBILINOGEN UR STRIP-MCNC: <2 MG/DL
VLDLC SERPL CALC-MCNC: 13 MG/DL (ref 8–30)
WBC # BLD AUTO: 7.2 K/UL (ref 4–11)
WBC URNS QL MICRO: ABNORMAL /HPF (ref 0–2)

## 2018-12-04 ENCOUNTER — TELEPHONE (OUTPATIENT)
Dept: INTERNAL MEDICINE CLINIC | Age: 58
End: 2018-12-04

## 2018-12-05 NOTE — TELEPHONE ENCOUNTER
Patient was notified per Dr. Jose Guadalupe Ace all labs were good except Potassium alittle low  And to eat some bananas. Placed on VM.

## 2019-03-22 DIAGNOSIS — F51.01 PRIMARY INSOMNIA: ICD-10-CM

## 2019-03-22 RX ORDER — ZOLPIDEM TARTRATE 5 MG/1
5 TABLET ORAL
Qty: 90 TAB | Refills: 1 | Status: CANCELLED | OUTPATIENT
Start: 2019-03-22

## 2019-03-22 NOTE — TELEPHONE ENCOUNTER
Requested Prescriptions     Pending Prescriptions Disp Refills    zolpidem (AMBIEN) 5 mg tablet 90 Tab 1     Sig: Take 1 Tab by mouth nightly as needed for Sleep. Max Daily Amount: 5 mg.

## 2019-04-03 DIAGNOSIS — F51.01 PRIMARY INSOMNIA: ICD-10-CM

## 2019-04-03 NOTE — TELEPHONE ENCOUNTER
Requested Prescriptions     Pending Prescriptions Disp Refills    zolpidem (AMBIEN) 5 mg tablet 90 Tab 1     Sig: Take 1 Tab by mouth nightly as needed for Sleep. Max Daily Amount: 5 mg.        Last  3/21/19   TOO EARLY

## 2019-04-04 RX ORDER — ZOLPIDEM TARTRATE 5 MG/1
5 TABLET ORAL
Qty: 90 TAB | Refills: 1 | Status: SHIPPED | OUTPATIENT
Start: 2019-04-20 | End: 2019-06-14 | Stop reason: SDUPTHER

## 2019-06-14 DIAGNOSIS — F51.01 PRIMARY INSOMNIA: ICD-10-CM

## 2019-06-17 RX ORDER — ZOLPIDEM TARTRATE 5 MG/1
5 TABLET ORAL
Qty: 90 TAB | Refills: 1 | Status: SHIPPED | OUTPATIENT
Start: 2019-06-17 | End: 2019-06-24 | Stop reason: SDUPTHER

## 2019-06-24 DIAGNOSIS — F51.01 PRIMARY INSOMNIA: ICD-10-CM

## 2019-06-25 RX ORDER — ZOLPIDEM TARTRATE 5 MG/1
5 TABLET ORAL
Qty: 90 TAB | Refills: 1 | Status: SHIPPED | OUTPATIENT
Start: 2019-06-25 | End: 2019-08-12 | Stop reason: ALTCHOICE

## 2019-06-25 NOTE — TELEPHONE ENCOUNTER
Controlled Substance Refill Request Information    Medication: zolpidem    Last fill on : 3/21/2019 #90    Last OV: 11/30/2018    Next appointment: none scheduled    Controlled Substance Agreement on File: No

## 2019-08-12 ENCOUNTER — OFFICE VISIT (OUTPATIENT)
Dept: FAMILY MEDICINE CLINIC | Facility: CLINIC | Age: 59
End: 2019-08-12

## 2019-08-12 VITALS
OXYGEN SATURATION: 98 % | TEMPERATURE: 97.8 F | RESPIRATION RATE: 14 BRPM | HEART RATE: 69 BPM | HEIGHT: 65 IN | SYSTOLIC BLOOD PRESSURE: 102 MMHG | DIASTOLIC BLOOD PRESSURE: 78 MMHG | BODY MASS INDEX: 21.99 KG/M2 | WEIGHT: 132 LBS

## 2019-08-12 DIAGNOSIS — F51.01 PRIMARY INSOMNIA: Primary | ICD-10-CM

## 2019-08-12 DIAGNOSIS — E87.6 HYPOKALEMIA: ICD-10-CM

## 2019-08-12 RX ORDER — ZOLPIDEM TARTRATE 10 MG/1
TABLET ORAL
Qty: 30 TAB | Refills: 3 | Status: SHIPPED | OUTPATIENT
Start: 2019-08-12

## 2019-08-12 NOTE — PROGRESS NOTES
Edrick Halsted presents today for   Chief Complaint   Patient presents with   [facility] Annual Wellness Visit       Edrick Halsted preferred language for health care discussion is english. Is someone accompanying this pt? no    Is the patient using any DME equipment during [address]? no    Depression Screening:  3 most recent PHQ Screens 8/12/2019   Little interest or pleasure in doing things Not at all   Feeling down, depressed, irritable, or hopeless Not at all   Total Score PHQ 2 0       Learning Assessment:  Learning Assessment 1/8/2016   PRIMARY LEARNER Patient   HIGHEST LEVEL OF EDUCATION - PRIMARY LEARNER  SOME COLLEGE   BARRIERS PRIMARY LEARNER [address] CAREGIVER No   CO-LEARNER HIGHEST LEVEL OF EDUCATION SOME COLLEGE   BARRIERS CO-LEARNER NONE   PRIMARY LANGUAGE ENGLISH    NEED No   LEARNER PREFERENCE PRIMARY DEMONSTRATION     READING     PICTURES     VIDEOS   ANSWERED BY patient   RELATIONSHIP SELF       Abuse Screening:  No flowsheet data found. Health Maintenance reviewed and discussed and ordered per Provider. Health Maintenance Due   Topic Date Due    Hepatitis C Screening  1960    Pneumococcal 0-64 years (1 of 3 - PCV13) 11/28/1968    Shingrix Vaccine Age 50> (1 of 2) 11/28/2012    BREAST CANCER SCRN MAMMOGRAM  11/28/2012    FOBT Q 1 YEAR AGE 50-75  11/28/2012    PAP AKA CERVICAL CYTOLOGY  05/03/2014    DTaP/Tdap/Td series (1 - Tdap) 07/08/2017    Influenza Age 9 to Adult  08/01/2019   . Edrick Halsted is updated on all     Pt currently taking Antiplatelet therapy? no    Coordination of Care:  1. Have you been to the ER, urgent care clinic since your last visit? no  Hospitalized since your last visit? no    2. Have you seen or consulted any other health care providers outside of the [address] [place] [address] since your last visit? no Include any pap smears or colon screening.  no

## 2019-08-13 LAB
ANION GAP SERPL CALC-SCNC: 20 MMOL/L
BUN SERPL-MCNC: 15 MG/DL (ref 6–22)
CALCIUM SERPL-MCNC: 10.3 MG/DL (ref 8.4–10.5)
CHLORIDE SERPL-SCNC: 91 MMOL/L (ref 98–110)
CO2 SERPL-SCNC: 26 MMOL/L (ref 20–32)
CREAT SERPL-MCNC: 0.9 MG/DL (ref 0.5–1.2)
GFRAA, 66117: >60
GFRNA, 66118: >60
GLUCOSE SERPL-MCNC: 81 MG/DL (ref 70–99)
POTASSIUM SERPL-SCNC: 3.3 MMOL/L (ref 3.5–5.5)
SODIUM SERPL-SCNC: 137 MMOL/L (ref 133–145)

## 2019-08-13 NOTE — PROGRESS NOTES
The patient presents to the office today with the chief complaint of insomnia    HPI    The patient is doing ok but she is having continued problems with insomnia. The Ambien at 5 mg helped but it did not last long enough. The patient has previous labs with a mildly low potassium. Review of Systems   Respiratory: Negative for shortness of breath. Cardiovascular: Negative for chest pain and leg swelling. Allergies   Allergen Reactions    Latex Rash    Latex, Natural Rubber Other (comments)    Egg Rash     Other reaction(s): unknown    Lactose Other (comments)    Milk Containing Products Rash    Wheat Rash     Other reaction(s): unknown  Determined by testing       Current Outpatient Medications   Medication Sig Dispense Refill    zolpidem (AMBIEN) 10 mg tablet 1 tab at bedtime as needed for sleep 30 Tab 3    oxybutynin chloride XL (DITROPAN XL) 10 mg CR tablet Take 1 Tab by mouth two (2) times a day. 180 Tab 3    triamterene-hydroCHLOROthiazide (MAXZIDE) 75-50 mg per tablet TAKE 2 TABLETS BY MOUTH  DAILY 180 Tab 2    CLARITIN-D 12 HOUR 5-120 mg per tablet   1    Dexlansoprazole (DEXILANT) 60 mg CpDB Take  by mouth.  montelukast (SINGULAIR) 10 mg tablet Take 10 mg by mouth daily. Past Medical History:   Diagnosis Date    Adverse effect of anesthesia     Arthropathy     Asthma     Bullous impetigo     6/13    Chronic recurrent bronchitis     Chronic sinusitis     Chronic urinary tract infection     Deficiency anemia     Degenerative joint disease     right knee arthroscopy  7/13    Environmental allergies     Eosinophilic esophagitis     Esophageal reflux     Esophageal stricture     GERD     Hemorrhoids     inter.  wo mention of complication     History of blood transfusion     IgG3 subclass deficiency     MRSA (methicillin resistant Staphylococcus aureus)     Patent ductus arteriosus     Patent ductus arteriosus repair     [address]    PONV (postoperative nausea and vomiting)     Stress incontinence     bladder sling 10/13    Urinary incontinence     Urinary system disorder        Past Surgical History:   Procedure Laterality Date    HX BLADDER SUSPENSION  10/21/2013    HX CERVICAL FUSION      C5-6    HX FRACTURE TX      age 17  right arm    HX HEMORRHOIDECTOMY      HX HYSTERECTOMY      HX KNEE ARTHROSCOPY  07/2013     right    HX MOHS PROCEDURES      right x2    HX OTHER SURGICAL      PDA repair 1966 [facility] [address] OTHER SURGICAL  12/15, 2/15, 10/17, 11/17    sinus    HX OTHER SURGICAL  08/18/2014    Bronchoscopy     HX OTHER SURGICAL  02/2015, 04/2017, 08/2017    EGD    HX UROLOGICAL  11/06/2015    INJ. BULK. AGENT FOR Valerie Rakesha.         Social History     Socioeconomic History    Marital status: SINGLE     Spouse name: Not on file    Number of children: Not on file    Years of education: Not on file    Highest education level: Not on file   Occupational History    Not on file   Social Needs    Financial resource strain: Not on file    Food insecurity:     Worry: Not on file     Inability: Not on file    Transportation needs:     Medical: Not on file     Non-medical: Not on file   Tobacco Use    Smoking status: Never Smoker    Smokeless tobacco: Never Used    Tobacco comment: high school experiment   Substance and Sexual Activity    Alcohol use:  Yes     Alcohol/week: 0.0 standard drinks     Comment: occasional    Drug use: No    Sexual activity: Yes   Lifestyle    Physical activity:     Days per week: Not on file     Minutes per session: Not on file    Stress: Not on file   Relationships    Social connections:     Talks on phone: Not on file     Gets together: Not on file     Attends [employer]oration[employer] service: Not on file     Active member of club or organization: Not on file     Attends meetings of clubs or organizations: Not on file     Relationship status: Not on file    Intimate partner violence:     Fear of current or ex partner: Not on file     Emotionally abused: Not on file     Physically abused: Not on file     Forced sexual activity: Not on file   Other Topics Concern    Not on file   Social History Narrative    ** Merged History Encounter **            Patient does not have an advanced directive on file    Visit Vitals  /78 (BP 1 Location: Left arm, BP Patient Position: Sitting)   Pulse 69   Temp 97.8 °F (36.6 °C) (Tympanic)   Resp 14   Ht 5' 5\" (1.651 m)   Wt 132 lb (59.9 kg)   SpO2 98%   BMI 21.97 kg/m²       Physical Exam  No Cervical Lymphadenopathy  No Supraclavicular Lymphadenopathy  Thyroid is Normal  Lungs are normal to percussion. Clear to auscultation   Heart:  S1 S2 are normal, No gallops, No murmurs  No Carotid Bruits  Abdomen:  Normal Bowel Sounds. No tenderness. No masses. No Hepatomegaly or Splenomegaly  LE:  Strong Pedal Pulses. No Edema      BMI:  OK    Office Visit on 08/12/2019   Component Date Value Ref Range Status    Glucose 08/12/2019 81  70 - 99 mg/dL Final    BUN 08/12/2019 15  6 - 22 mg/dL Final    Creatinine 08/12/2019 0.9  0.5 - 1.2 mg/dL Final    Sodium 08/12/2019 137  133 - 145 mmol/L Final    Potassium 08/12/2019 3.3* 3.5 - 5.5 mmol/L Final    Chloride 08/12/2019 91* 98 - 110 mmol/L Final    CO2 08/12/2019 26  20 - 32 mmol/L Final    Calcium 08/12/2019 10.3  8.4 - 10.5 mg/dL Final    Anion gap 08/12/2019 20.0  mmol/L Final    Comment: Estimated GFR results are reported in mL/min/1.73 sq.m. by the MDRD equation. This eGFR is validated for stable chronic renal failure patients. This   equation  is unreliable in acute illness or patients with normal renal function.       GFRAA 08/12/2019 >60.0  >60.0 Final    GFRNA 08/12/2019 >60.0  >60.0 Final       .  Results for orders placed or performed in visit on 29/22/26   METABOLIC PANEL, BASIC   Result Value Ref Range    Glucose 81 70 - 99 mg/dL    BUN 15 6 - 22 mg/dL    Creatinine 0.9 0.5 - 1.2 mg/dL    Sodium 137 133 - 145 mmol/L    Potassium 3.3 (L) 3.5 - 5.5 mmol/L    Chloride 91 (L) 98 - 110 mmol/L    CO2 26 20 - 32 mmol/L    Calcium 10.3 8.4 - 10.5 mg/dL    Anion gap 20.0 mmol/L    GFRAA >60.0 >60.0    GFRNA >60.0 >60.0    Narrative    Unless additionally indicated, test performed at: [facility], [address] [place] [address]. PH: [phone]. Assessment / Plan      ICD-10-CM ICD-9-CM    1. Primary insomnia F51.01 307.42 zolpidem (AMBIEN) 10 mg tablet   2. Hypokalemia W57.1 988.3 METABOLIC PANEL, BASIC      METABOLIC PANEL, BASIC       Ambien 10 mg q HS   BMP  she was advised to continue her maintenance medications      Follow-up and Dispositions    · Return in about 6 months (around 2/12/2020). I asked Eddie Izaguirre if she has any questions and I answered the questions. Eddie Stoddard states that she understands the treatment plan and agrees with the treatment plan          THIS DOCUMENT WAS CREATED WITH A VOICE ACTIVATED DICTATION SYSTEM.   IT MAY CONTAIN TRANSCRIPTION ERRORS

## 2021-08-03 PROBLEM — K21.9 ESOPHAGEAL REFLUX: Status: RESOLVED | Noted: 2021-08-03 | Resolved: 2021-08-03
